# Patient Record
Sex: FEMALE | Race: WHITE | NOT HISPANIC OR LATINO | Employment: FULL TIME | URBAN - NONMETROPOLITAN AREA
[De-identification: names, ages, dates, MRNs, and addresses within clinical notes are randomized per-mention and may not be internally consistent; named-entity substitution may affect disease eponyms.]

---

## 2022-08-03 ENCOUNTER — OFFICE VISIT (OUTPATIENT)
Dept: URGENT CARE | Facility: CLINIC | Age: 37
End: 2022-08-03
Payer: COMMERCIAL

## 2022-08-03 VITALS
HEIGHT: 61 IN | HEART RATE: 88 BPM | OXYGEN SATURATION: 100 % | RESPIRATION RATE: 20 BRPM | SYSTOLIC BLOOD PRESSURE: 125 MMHG | WEIGHT: 151.4 LBS | TEMPERATURE: 99 F | DIASTOLIC BLOOD PRESSURE: 80 MMHG | BODY MASS INDEX: 28.58 KG/M2

## 2022-08-03 DIAGNOSIS — R11.0 NAUSEA: ICD-10-CM

## 2022-08-03 DIAGNOSIS — R10.31 RIGHT LOWER QUADRANT ABDOMINAL PAIN: Primary | ICD-10-CM

## 2022-08-03 PROBLEM — K21.9 GASTROESOPHAGEAL REFLUX DISEASE WITHOUT ESOPHAGITIS: Status: ACTIVE | Noted: 2022-08-03

## 2022-08-03 LAB
SL AMB  POCT GLUCOSE, UA: NORMAL
SL AMB LEUKOCYTE ESTERASE,UA: NORMAL
SL AMB POCT BILIRUBIN,UA: NORMAL
SL AMB POCT BLOOD,UA: NORMAL
SL AMB POCT CLARITY,UA: CLEAR
SL AMB POCT COLOR,UA: NORMAL
SL AMB POCT KETONES,UA: NORMAL
SL AMB POCT NITRITE,UA: NORMAL
SL AMB POCT PH,UA: 7.5
SL AMB POCT SPECIFIC GRAVITY,UA: 1.02
SL AMB POCT URINE PROTEIN: NORMAL
SL AMB POCT UROBILINOGEN: 0.2

## 2022-08-03 PROCEDURE — 81002 URINALYSIS NONAUTO W/O SCOPE: CPT | Performed by: PHYSICIAN ASSISTANT

## 2022-08-03 PROCEDURE — 99203 OFFICE O/P NEW LOW 30 MIN: CPT | Performed by: PHYSICIAN ASSISTANT

## 2022-08-03 PROCEDURE — S9088 SERVICES PROVIDED IN URGENT: HCPCS | Performed by: PHYSICIAN ASSISTANT

## 2022-08-03 RX ORDER — ALBUTEROL SULFATE 90 UG/1
AEROSOL, METERED RESPIRATORY (INHALATION)
COMMUNITY

## 2022-08-03 RX ORDER — ONDANSETRON 8 MG/1
8 TABLET, ORALLY DISINTEGRATING ORAL EVERY 8 HOURS PRN
Qty: 21 TABLET | Refills: 0 | Status: SHIPPED | OUTPATIENT
Start: 2022-08-03 | End: 2022-08-10

## 2022-08-03 RX ORDER — OMEPRAZOLE 40 MG/1
CAPSULE, DELAYED RELEASE ORAL
COMMUNITY

## 2022-08-03 NOTE — PATIENT INSTRUCTIONS
Acute Diarrhea   WHAT YOU NEED TO KNOW:   What do I need to know about acute diarrhea? Acute diarrhea starts quickly and lasts a short time, usually 1 to 3 days  It can last up to 2 weeks  What causes acute diarrhea? Bacteria, such as E coli or salmonella    Viruses, such as rotavirus and norovirus    A parasite, such as giardia    Medicines, such as laxatives, antacids, or antibiotics    An allergy to lactose, soy, or gluten    Eating food or drinking water that contains germs    Medical treatments, such as chemotherapy or radiation    What other signs and symptoms might I have with acute diarrhea? You may have 3 or more episodes of diarrhea  It may be hard to control your diarrhea  You may also have any of the following:  Fever and chills    Headache or abdominal pain    Nausea and vomiting    Symptoms of dehydration such as thirst, decreased urination, dry skin, sunken eyes, or fast, pounding heartbeat    What does my healthcare provider need to know about my acute diarrhea? Your healthcare provider will ask about your symptoms  He or she will ask what you have recently eaten and if you have traveled to other countries  Tell the provider what medicines you use or if you have been around anyone who is sick  Your healthcare provider may check you for signs of dehydration  He or she may also test your blood and bowel movement for infection  How is acute diarrhea treated? Acute diarrhea usually gets better without treatment  You may need any of the following if your diarrhea is severe or lasts longer than a few days:  Diarrhea medicine  is an over-the-counter medicine that helps slow or stop your diarrhea  Do not  take this medicine unless your healthcare provider says it is okay  Antibiotics  may be given to help treat an infection caused by bacteria  Antiparasitics  may be given to treat an infection caused by parasites  How can acute diarrhea be managed? Drink liquids as directed    Liquids will help prevent dehydration caused by diarrhea  Ask your healthcare provider how much liquid to drink each day and which liquids are best for you  You may need to drink an oral rehydration solution (ORS)  An ORS has the right amounts of water, salts, and sugar you need to replace body fluids  You can buy an ORS at most grocery stores and pharmacies  Eat foods that are easy to digest   Examples include rice, lentils, cereal, bananas, potatoes, and bread  It also includes some fruits (bananas, melon), well-cooked vegetables, and lean meats  Do not eat foods high in fiber, fat, or sugar  Also, do not drink alcohol until your diarrhea is gone  How can acute diarrhea be prevented? Wash your hands often  Use soap and water  Wash your hands before you eat or prepare food  Also wash your hands after you use the bathroom  Use an alcohol-based hand gel when soap and water are not available  Keep bathroom surfaces clean  This helps prevent the spread of germs that cause acute diarrhea  Wash fruits and vegetables well before you eat them  This can help remove germs that cause diarrhea  If possible, remove the skin from fruits and vegetables, or cook them well before you eat them  Cook meat and poultry as directed  Meat includes beef and pork  Poultry includes chicken, turkey, and duck  Cook ground meat  to 160°F      [employer], whole poultry, or cuts of poultry  to at least 165°F  Remove the poultry from heat  Let it stand for 3 minutes before you eat it  Cook whole cuts of meat other than poultry  to at least 145°F  Remove the meat from heat  Let it stand for 3 minutes before you eat it  Wash dishes that have touched raw meat or poultry with hot water and soap  This includes cutting boards, utensils, dishes, and serving containers  Place raw or cooked meat in the refrigerator as soon as possible  Bacteria can grow in meat that is left at room temperature too long       Do not eat raw or undercooked oysters, clams, or mussels  These foods may be contaminated and cause infection  Drink only filtered or treated water when you travel  Do not put ice in your drinks  Drink bottled water whenever possible  When should I seek immediate care? You feel confused  Your heartbeat is faster than usual      Your eyes look deeply sunken, or you have no tears when you cry  You urinate less than usual, or your urine is dark yellow  You have blood or mucus in your bowel movements  You have severe abdominal pain  You are unable to drink any liquids  When should I contact my healthcare provider? Your symptoms do not get better with treatment  You have a fever higher than 101 3°F (38 5°C)  You have trouble eating and drinking because you are vomiting  Your diarrhea does not get better in 7 days  You have questions or concerns about your condition or care  CARE AGREEMENT:   You have the right to help plan your care  Learn about your health condition and how it may be treated  Discuss treatment options with your healthcare providers to decide what care you want to receive  You always have the right to refuse treatment  The above information is an  only  It is not intended as medical advice for individual conditions or treatments  Talk to your doctor, nurse or pharmacist before following any medical regimen to see if it is safe and effective for you  © Copyright [employer] 2022 Information is for End User's use only and may not be sold, redistributed or otherwise used for commercial purposes   All illustrations and images included in CareNotes® are the copyrighted property of [email or web address]  or [address] [place] [address]

## 2022-08-03 NOTE — LETTER
August 3, 2022     Patient: Clare Dorsey   YOB: 1985   Date of Visit: 8/3/2022       To Whom it May Concern:    Clare Dorsey was seen in my clinic on 8/3/2022  She may return to work on 08/06/2022  If you have any questions or concerns, please don't hesitate to call           Sincerely,          Loyda Bhatt PA-C        CC: No Recipients

## 2022-08-03 NOTE — PROGRESS NOTES
[facility]        NAME: Josiane Barron is a 29 y o  female  : 1985    MRN: [id number]  DATE: August 3, 2022  TIME: 4:14 PM    Assessment and Plan   Right lower quadrant abdominal pain [R10 31]  1  Right lower quadrant abdominal pain  POCT urine dip   2  Nausea  ondansetron (Zofran ODT) 8 mg disintegrating tablet         Patient Instructions   Patient Instructions     Acute Diarrhea   WHAT YOU NEED TO KNOW:   What do I need to know about acute diarrhea? Acute diarrhea starts quickly and lasts a short time, usually 1 to 3 days  It can last up to 2 weeks  What causes acute diarrhea? · Bacteria, such as E coli or salmonella    · Viruses, such as rotavirus and norovirus    · A parasite, such as giardia    · Medicines, such as laxatives, antacids, or antibiotics    · An allergy to lactose, soy, or gluten    · Eating food or drinking water that contains germs    · Medical treatments, such as chemotherapy or radiation    What other signs and symptoms might I have with acute diarrhea? You may have 3 or more episodes of diarrhea  It may be hard to control your diarrhea  You may also have any of the following:  · Fever and chills    · Headache or abdominal pain    · Nausea and vomiting    · Symptoms of dehydration such as thirst, decreased urination, dry skin, sunken eyes, or fast, pounding heartbeat    What does my healthcare provider need to know about my acute diarrhea? Your healthcare provider will ask about your symptoms  He or she will ask what you have recently eaten and if you have traveled to other countries  Tell the provider what medicines you use or if you have been around anyone who is sick  Your healthcare provider may check you for signs of dehydration  He or she may also test your blood and bowel movement for infection  How is acute diarrhea treated? Acute diarrhea usually gets better without treatment   You may need any of the following if your diarrhea is severe or lasts longer than a few days:  · Diarrhea medicine  is an over-the-counter medicine that helps slow or stop your diarrhea  Do not  take this medicine unless your healthcare provider says it is okay  · Antibiotics  may be given to help treat an infection caused by bacteria  · Antiparasitics  may be given to treat an infection caused by parasites  How can acute diarrhea be managed? · Drink liquids as directed  Liquids will help prevent dehydration caused by diarrhea  Ask your healthcare provider how much liquid to drink each day and which liquids are best for you  You may need to drink an oral rehydration solution (ORS)  An ORS has the right amounts of water, salts, and sugar you need to replace body fluids  You can buy an ORS at most grocery stores and pharmacies  · Eat foods that are easy to digest   Examples include rice, lentils, cereal, bananas, potatoes, and bread  It also includes some fruits (bananas, melon), well-cooked vegetables, and lean meats  Do not eat foods high in fiber, fat, or sugar  Also, do not drink alcohol until your diarrhea is gone  How can acute diarrhea be prevented? 1  Wash your hands often  Use soap and water  Wash your hands before you eat or prepare food  Also wash your hands after you use the bathroom  Use an alcohol-based hand gel when soap and water are not available  2  Keep bathroom surfaces clean  This helps prevent the spread of germs that cause acute diarrhea  3  Wash fruits and vegetables well before you eat them  This can help remove germs that cause diarrhea  If possible, remove the skin from fruits and vegetables, or cook them well before you eat them  [address] as directed  Meat includes beef and pork  Poultry includes chicken, turkey, and duck  ? Cook ground meat  to 160°F      ? Cook ground poultry, whole poultry, or cuts of poultry  to at least 165°F  Remove the poultry from heat   Let it stand for 3 minutes before you eat it      ? Cook whole cuts of meat other than poultry  to at least 145°F  Remove the meat from heat  Let it stand for 3 minutes before you eat it  5  Wash dishes that have touched raw meat or poultry with hot water and soap  This includes cutting boards, utensils, dishes, and serving containers  6  Place raw or cooked meat in the refrigerator as soon as possible  Bacteria can grow in meat that is left at room temperature too long  7  Do not eat raw or undercooked oysters, clams, or mussels  These foods may be contaminated and cause infection  8  Drink only filtered or treated water when you travel  Do not put ice in your drinks  Drink bottled water whenever possible  When should I seek immediate care? · You feel confused  · Your heartbeat is faster than usual      · Your eyes look deeply sunken, or you have no tears when you cry  · You urinate less than usual, or your urine is dark yellow  · You have blood or mucus in your bowel movements  · You have severe abdominal pain  · You are unable to drink any liquids  When should I contact my healthcare provider? · Your symptoms do not get better with treatment  · You have a fever higher than 101 3°F (38 5°C)  · You have trouble eating and drinking because you are vomiting  · Your diarrhea does not get better in 7 days  · You have questions or concerns about your condition or care  CARE AGREEMENT:   You have the right to help plan your care  Learn about your health condition and how it may be treated  Discuss treatment options with your healthcare providers to decide what care you want to receive  You always have the right to refuse treatment  The above information is an  only  It is not intended as medical advice for individual conditions or treatments  Talk to your doctor, nurse or pharmacist before following any medical regimen to see if it is safe and effective for you    © Copyright [employer] 2022 Information is for [employer] use only and may not be sold, redistributed or otherwise used for commercial purposes  All illustrations and images included in CareNotes® are the copyrighted property of [employer]  or [employer]          Follow up with PCP in 3-5 days  Proceed to  ER if symptoms worsen  Chief Complaint     Chief Complaint   Patient presents with    Abdominal Pain     Right side sharp pain     Diarrhea     And nasuea going on for 2 weeks          History of Present Illness       The patient is a 28-year-old female who presents to the clinic complaining of right lower abdominal pain for the past 2 weeks  She states that the pain initially started in her right lower quadrant and now seems to be located more in her right pelvic area  She states the pain is intermittent in nature in seems to be worse with movement  She states the pain is sharp and approximately 5/10  She states the pain is better with rest   She also has associated nausea and has had diarrhea over the past 2 weeks  She denies associated fevers, chills, melena, or blood in her stools  She does have a history of gastric reflux but does not follow with GI and has never had he scope  She states that her menstrual cycle is normal and currently denies pregnancy  Review of Systems   Review of Systems   Constitutional: Negative for chills and fever  HENT: Negative for congestion, ear pain, postnasal drip, rhinorrhea, sinus pressure, sore throat and trouble swallowing  Eyes: Negative for pain and visual disturbance  Respiratory: Positive for cough  Negative for shortness of breath  Cardiovascular: Negative for chest pain and palpitations  Gastrointestinal: Positive for abdominal pain, diarrhea and nausea  Negative for constipation and vomiting  Genitourinary: Positive for difficulty urinating   Negative for dysuria, frequency, hematuria, menstrual problem, pelvic pain, urgency, vaginal bleeding, vaginal discharge and vaginal pain  Musculoskeletal: Negative for arthralgias and back pain  Skin: Negative for color change and rash  Neurological: Positive for light-headedness  Negative for seizures and syncope  All other systems reviewed and are negative  Current Medications       Current Outpatient Medications:     ondansetron (Zofran ODT) 8 mg disintegrating tablet, Take 1 tablet (8 mg total) by mouth every 8 (eight) hours as needed for nausea or vomiting for up to 7 days, Disp: 21 tablet, Rfl: 0    albuterol (PROVENTIL HFA,VENTOLIN HFA) 90 mcg/act inhaler, Take by mouth, Disp: , Rfl:     omeprazole (PriLOSEC) 40 MG capsule, , Disp: , Rfl:     Current Allergies     Allergies as of 08/03/2022    (No Known Allergies)            The following portions of the patient's history were reviewed and updated as appropriate: allergies, current medications, past family history, past medical history, past social history, past surgical history and problem list      Past Medical History:   Diagnosis Date    Asthma        History reviewed  No pertinent surgical history  Family History   Problem Relation Age of Onset    Diabetes Father          Medications have been verified  Objective   /80   Pulse 88   Temp 99 °F (37 2 °C)   Resp 20   Ht 5' 1 2" (1 554 m)   Wt 68 7 kg (151 lb 6 4 oz)   SpO2 100%   BMI 28 42 kg/m²        Physical Exam     Physical Exam  Constitutional:       Appearance: She is well-developed  She is not diaphoretic  HENT:      Head: Normocephalic  Eyes:      General:         Right eye: No discharge  Left eye: No discharge  Pupils: Pupils are equal, round, and reactive to light  Neck:      Thyroid: No thyromegaly  Cardiovascular:      Rate and Rhythm: Normal rate  Heart sounds: No murmur heard  Pulmonary:      Effort: Pulmonary effort is normal  No respiratory distress  Breath sounds: No wheezing or rales     Chest:      Chest wall: No tenderness  Abdominal:      General: There is no distension  Palpations: Abdomen is soft  Tenderness: There is abdominal tenderness in the right lower quadrant and suprapubic area  There is no guarding or rebound  Musculoskeletal:         General: Normal range of motion  Cervical back: Normal range of motion  Lymphadenopathy:      Cervical: No cervical adenopathy  Skin:     General: Skin is warm  Neurological:      Mental Status: She is alert and oriented to person, place, and time  Since the patient's symptoms have been going on for 2 weeks she will need further testing ankle doing stool cultures and labs  I suggest she contact her primary care doctor to order these test   I will give her Zofran for nausea  The patient was instructed to go the ER if symptoms worsen

## 2024-04-14 ENCOUNTER — HOSPITAL ENCOUNTER (EMERGENCY)
Facility: HOSPITAL | Age: 39
Discharge: HOME | End: 2024-04-15
Attending: EMERGENCY MEDICINE | Admitting: EMERGENCY MEDICINE
Payer: COMMERCIAL

## 2024-04-14 ENCOUNTER — APPOINTMENT (EMERGENCY)
Dept: CT IMAGING | Facility: HOSPITAL | Age: 39
End: 2024-04-14
Payer: COMMERCIAL

## 2024-04-14 VITALS
TEMPERATURE: 99 F | WEIGHT: 154.1 LBS | BODY MASS INDEX: 30.25 KG/M2 | SYSTOLIC BLOOD PRESSURE: 131 MMHG | HEIGHT: 60 IN | DIASTOLIC BLOOD PRESSURE: 76 MMHG | OXYGEN SATURATION: 99 % | HEART RATE: 100 BPM | RESPIRATION RATE: 17 BRPM

## 2024-04-14 DIAGNOSIS — N83.202 CYST OF LEFT OVARY: ICD-10-CM

## 2024-04-14 DIAGNOSIS — R10.31 RIGHT LOWER QUADRANT ABDOMINAL PAIN: Primary | ICD-10-CM

## 2024-04-14 LAB
ALBUMIN SERPL BCP-MCNC: 4.4 G/DL (ref 3.5–5)
ALP SERPL-CCNC: 51 U/L (ref 34–104)
ALT SERPL W P-5'-P-CCNC: 9 U/L (ref 7–52)
ANION GAP SERPL CALCULATED.3IONS-SCNC: 9 MMOL/L (ref 4–13)
AST SERPL W P-5'-P-CCNC: 12 U/L (ref 13–39)
BASOPHILS # BLD AUTO: 0.05 THOUSANDS/ÂΜL (ref 0–0.1)
BASOPHILS NFR BLD AUTO: 1 % (ref 0–1)
BILIRUB SERPL-MCNC: 0.27 MG/DL (ref 0.2–1)
BILIRUB UR QL STRIP: ABNORMAL
BUN SERPL-MCNC: 17 MG/DL (ref 5–25)
CALCIUM SERPL-MCNC: 9.3 MG/DL (ref 8.4–10.2)
CHLORIDE SERPL-SCNC: 106 MMOL/L (ref 96–108)
CLARITY UR: CLEAR
CO2 SERPL-SCNC: 22 MMOL/L (ref 21–32)
COLOR UR: YELLOW
CREAT SERPL-MCNC: 1.14 MG/DL (ref 0.6–1.3)
EOSINOPHIL # BLD AUTO: 0.48 THOUSAND/ÂΜL (ref 0–0.61)
EOSINOPHIL NFR BLD AUTO: 5 % (ref 0–6)
ERYTHROCYTE [DISTWIDTH] IN BLOOD BY AUTOMATED COUNT: 12.7 % (ref 11.6–15.1)
EXT PREGNANCY TEST URINE: NEGATIVE
EXT. CONTROL: NORMAL
GFR SERPL CREATININE-BSD FRML MDRD: 61 ML/MIN/1.73SQ M
GLUCOSE SERPL-MCNC: 106 MG/DL (ref 65–140)
GLUCOSE UR STRIP-MCNC: NEGATIVE MG/DL
HCT VFR BLD AUTO: 41.9 % (ref 34.8–46.1)
HGB BLD-MCNC: 13.6 G/DL (ref 11.5–15.4)
HGB UR QL STRIP.AUTO: NEGATIVE
IMM GRANULOCYTES # BLD AUTO: 0.05 THOUSAND/UL (ref 0–0.2)
IMM GRANULOCYTES NFR BLD AUTO: 1 % (ref 0–2)
KETONES UR STRIP-MCNC: ABNORMAL MG/DL
LEUKOCYTE ESTERASE UR QL STRIP: NEGATIVE
LIPASE SERPL-CCNC: 36 U/L (ref 11–82)
LYMPHOCYTES # BLD AUTO: 3.11 THOUSANDS/ÂΜL (ref 0.6–4.47)
LYMPHOCYTES NFR BLD AUTO: 32 % (ref 14–44)
MCH RBC QN AUTO: 28.5 PG (ref 26.8–34.3)
MCHC RBC AUTO-ENTMCNC: 32.5 G/DL (ref 31.4–37.4)
MCV RBC AUTO: 88 FL (ref 82–98)
MONOCYTES # BLD AUTO: 0.68 THOUSAND/ÂΜL (ref 0.17–1.22)
MONOCYTES NFR BLD AUTO: 7 % (ref 4–12)
NEUTROPHILS # BLD AUTO: 5.37 THOUSANDS/ÂΜL (ref 1.85–7.62)
NEUTS SEG NFR BLD AUTO: 54 % (ref 43–75)
NITRITE UR QL STRIP: NEGATIVE
NRBC BLD AUTO-RTO: 0 /100 WBCS
PH UR STRIP.AUTO: 6 [PH]
PLATELET # BLD AUTO: 322 THOUSANDS/UL (ref 149–390)
PMV BLD AUTO: 9.1 FL (ref 8.9–12.7)
POTASSIUM SERPL-SCNC: 3.7 MMOL/L (ref 3.5–5.3)
PROT SERPL-MCNC: 7.3 G/DL (ref 6.4–8.4)
PROT UR STRIP-MCNC: NEGATIVE MG/DL
RBC # BLD AUTO: 4.77 MILLION/UL (ref 3.81–5.12)
SODIUM SERPL-SCNC: 137 MMOL/L (ref 135–147)
SP GR UR STRIP.AUTO: >=1.03 (ref 1–1.03)
UROBILINOGEN UR QL STRIP.AUTO: 1 E.U./DL
WBC # BLD AUTO: 9.74 THOUSAND/UL (ref 4.31–10.16)

## 2024-04-14 PROCEDURE — 74177 CT ABD & PELVIS W/CONTRAST: CPT

## 2024-04-14 PROCEDURE — 96375 TX/PRO/DX INJ NEW DRUG ADDON: CPT

## 2024-04-14 PROCEDURE — 81025 URINE PREGNANCY TEST: CPT | Performed by: EMERGENCY MEDICINE

## 2024-04-14 PROCEDURE — 36415 COLL VENOUS BLD VENIPUNCTURE: CPT | Performed by: EMERGENCY MEDICINE

## 2024-04-14 PROCEDURE — 99285 EMERGENCY DEPT VISIT HI MDM: CPT | Performed by: EMERGENCY MEDICINE

## 2024-04-14 PROCEDURE — 83690 ASSAY OF LIPASE: CPT | Performed by: EMERGENCY MEDICINE

## 2024-04-14 PROCEDURE — 80053 COMPREHEN METABOLIC PANEL: CPT | Performed by: EMERGENCY MEDICINE

## 2024-04-14 PROCEDURE — 99284 EMERGENCY DEPT VISIT MOD MDM: CPT

## 2024-04-14 PROCEDURE — 85025 COMPLETE CBC W/AUTO DIFF WBC: CPT | Performed by: EMERGENCY MEDICINE

## 2024-04-14 PROCEDURE — 96374 THER/PROPH/DIAG INJ IV PUSH: CPT

## 2024-04-14 PROCEDURE — 81003 URINALYSIS AUTO W/O SCOPE: CPT | Performed by: EMERGENCY MEDICINE

## 2024-04-14 PROCEDURE — 96361 HYDRATE IV INFUSION ADD-ON: CPT

## 2024-04-14 RX ORDER — KETOROLAC TROMETHAMINE 30 MG/ML
15 INJECTION, SOLUTION INTRAMUSCULAR; INTRAVENOUS ONCE
Status: COMPLETED | OUTPATIENT
Start: 2024-04-14 | End: 2024-04-14

## 2024-04-14 RX ORDER — ONDANSETRON 2 MG/ML
4 INJECTION INTRAMUSCULAR; INTRAVENOUS ONCE
Status: COMPLETED | OUTPATIENT
Start: 2024-04-14 | End: 2024-04-14

## 2024-04-14 RX ADMIN — IOHEXOL 100 ML: 350 INJECTION, SOLUTION INTRAVENOUS at 23:10

## 2024-04-14 RX ADMIN — ONDANSETRON 4 MG: 2 INJECTION INTRAMUSCULAR; INTRAVENOUS at 23:08

## 2024-04-14 RX ADMIN — SODIUM CHLORIDE 1000 ML: 0.9 INJECTION, SOLUTION INTRAVENOUS at 23:09

## 2024-04-14 RX ADMIN — KETOROLAC TROMETHAMINE 15 MG: 30 INJECTION, SOLUTION INTRAMUSCULAR; INTRAVENOUS at 23:09

## 2024-04-14 NOTE — Clinical Note
Stephanie Hosler was seen and treated in our emergency department on 4/14/2024.                Diagnosis:     Maribell  may return to work on return date.    She may return on this date: 04/16/2024         If you have any questions or concerns, please don't hesitate to call.      Anne-Marie Suarez, DO    ______________________________           _______________          _______________  Hospital Representative                              Date                                Time

## 2024-04-15 RX ORDER — ONDANSETRON 4 MG/1
4 TABLET, ORALLY DISINTEGRATING ORAL EVERY 6 HOURS PRN
Qty: 20 TABLET | Refills: 0 | Status: SHIPPED | OUTPATIENT
Start: 2024-04-15

## 2024-04-15 RX ORDER — DICYCLOMINE HCL 20 MG
20 TABLET ORAL ONCE
Status: COMPLETED | OUTPATIENT
Start: 2024-04-15 | End: 2024-04-15

## 2024-04-15 RX ORDER — DICYCLOMINE HCL 20 MG
20 TABLET ORAL 2 TIMES DAILY
Qty: 20 TABLET | Refills: 0 | Status: SHIPPED | OUTPATIENT
Start: 2024-04-15

## 2024-04-15 RX ADMIN — DICYCLOMINE HYDROCHLORIDE 20 MG: 20 TABLET ORAL at 00:32

## 2024-04-15 NOTE — ED PROVIDER NOTES
History  Chief Complaint   Patient presents with    Abdominal Pain     Stabbing lower umbilical pain since 0300 when it woke her up. Pt took no medicines today. +nausea Denies V/D or constipation     Patient is a 38-year-old female presenting to the emergency department today complaining of periumbilical pain now in the right lower quadrant associated with nausea, decreased appetite, symptoms started at 3 AM and worsen throughout the day, she denies any vomiting, no diarrhea, no dysuria or hematuria, no fever or chills, no sick contacts or questionable food intake, no previous abdominal surgeries, denies pregnancy        Prior to Admission Medications   Prescriptions Last Dose Informant Patient Reported? Taking?   albuterol (PROVENTIL HFA,VENTOLIN HFA) 90 mcg/act inhaler   Yes No   Sig: Take by mouth   omeprazole (PriLOSEC) 40 MG capsule   Yes No   ondansetron (Zofran ODT) 8 mg disintegrating tablet   No No   Sig: Take 1 tablet (8 mg total) by mouth every 8 (eight) hours as needed for nausea or vomiting for up to 7 days      Facility-Administered Medications: None       Past Medical History:   Diagnosis Date    Asthma        History reviewed. No pertinent surgical history.    Family History   Problem Relation Age of Onset    Diabetes Father      I have reviewed and agree with the history as documented.    E-Cigarette/Vaping    E-Cigarette Use Never User      E-Cigarette/Vaping Substances     Social History     Tobacco Use    Smoking status: Never    Smokeless tobacco: Never   Vaping Use    Vaping status: Never Used   Substance Use Topics    Alcohol use: Never    Drug use: Never       Review of Systems   Constitutional: Negative.    HENT: Negative.     Eyes: Negative.    Respiratory: Negative.     Cardiovascular: Negative.    Gastrointestinal:  Positive for abdominal pain and nausea.   Endocrine: Negative.    Genitourinary: Negative.    Musculoskeletal: Negative.    Skin: Negative.    Allergic/Immunologic: Negative.     Neurological: Negative.    Hematological: Negative.    Psychiatric/Behavioral: Negative.         Physical Exam  Physical Exam  Constitutional:       Appearance: She is well-developed.   HENT:      Head: Normocephalic and atraumatic.   Eyes:      Conjunctiva/sclera: Conjunctivae normal.      Pupils: Pupils are equal, round, and reactive to light.   Cardiovascular:      Rate and Rhythm: Normal rate.   Pulmonary:      Effort: Pulmonary effort is normal.   Abdominal:      Palpations: Abdomen is soft.      Tenderness: There is abdominal tenderness in the right lower quadrant and periumbilical area.   Musculoskeletal:         General: Normal range of motion.      Cervical back: Normal range of motion and neck supple.   Skin:     General: Skin is warm and dry.   Neurological:      Mental Status: She is alert and oriented to person, place, and time.         Vital Signs  ED Triage Vitals   Temperature Pulse Respirations Blood Pressure SpO2   04/14/24 2213 04/14/24 2213 04/14/24 2213 04/14/24 2213 04/14/24 2213   99 °F (37.2 °C) 100 17 131/76 99 %      Temp Source Heart Rate Source Patient Position - Orthostatic VS BP Location FiO2 (%)   04/14/24 2213 04/14/24 2213 04/14/24 2213 04/14/24 2213 --   Temporal Monitor Sitting Right arm       Pain Score       04/14/24 2309       7           Vitals:    04/14/24 2213   BP: 131/76   Pulse: 100   Patient Position - Orthostatic VS: Sitting         Visual Acuity      ED Medications  Medications   ondansetron (ZOFRAN) injection 4 mg (4 mg Intravenous Given 4/14/24 2308)   sodium chloride 0.9 % bolus 1,000 mL (0 mL Intravenous Stopped 4/15/24 0035)   ketorolac (TORADOL) injection 15 mg (15 mg Intravenous Given 4/14/24 2309)   iohexol (OMNIPAQUE) 350 MG/ML injection (SINGLE-DOSE) 100 mL (100 mL Intravenous Given 4/14/24 2310)   dicyclomine (BENTYL) tablet 20 mg (20 mg Oral Given 4/15/24 0032)       Diagnostic Studies  Results Reviewed       Procedure Component Value Units Date/Time     Comprehensive metabolic panel [[id number]]  (Abnormal) Collected: 04/14/24 2215    Lab Status: Final result Specimen: Blood from Arm, Right Updated: 04/14/24 2247     Sodium 137 mmol/L      Potassium 3.7 mmol/L      Chloride 106 mmol/L      CO2 22 mmol/L      ANION GAP 9 mmol/L      BUN 17 mg/dL      Creatinine 1.14 mg/dL      Glucose 106 mg/dL      Calcium 9.3 mg/dL      AST 12 U/L      ALT 9 U/L      Alkaline Phosphatase 51 U/L      Total Protein 7.3 g/dL      Albumin 4.4 g/dL      Total Bilirubin 0.27 mg/dL      eGFR 61 ml/min/1.73sq m     Narrative:      [employer] guidelines for Chronic Kidney Disease (CKD):     Stage 1 with normal or high GFR (GFR > 90 mL/min/1.73 square meters)    Stage 2 Mild CKD (GFR = 60-89 mL/min/1.73 square meters)    Stage 3A Moderate CKD (GFR = 45-59 mL/min/1.73 square meters)    Stage 3B Moderate CKD (GFR = 30-44 mL/min/1.73 square meters)    Stage 4 Severe CKD (GFR = 15-29 mL/min/1.73 square meters)    Stage 5 End Stage CKD (GFR <15 mL/min/1.73 square meters)  Note: GFR calculation is accurate only with a steady state creatinine    Lipase [[id number]]  (Normal) Collected: 04/14/24 2215    Lab Status: Final result Specimen: Blood from Arm, Right Updated: 04/14/24 2247     Lipase 36 u/L     CBC and differential [[id number]] Collected: 04/14/24 2215    Lab Status: Final result Specimen: Blood from Arm, Right Updated: 04/14/24 2244     WBC 9.74 Thousand/uL      RBC 4.77 Million/uL      Hemoglobin 13.6 g/dL      Hematocrit 41.9 %      MCV 88 fL      MCH 28.5 pg      MCHC 32.5 g/dL      RDW 12.7 %      MPV 9.1 fL      Platelets 322 Thousands/uL      nRBC 0 /100 WBCs      Segmented % 54 %      Immature Grans % 1 %      Lymphocytes % 32 %      Monocytes % 7 %      Eosinophils Relative 5 %      Basophils Relative 1 %      Absolute Neutrophils 5.37 Thousands/µL      Absolute Immature Grans 0.05 Thousand/uL      Absolute Lymphocytes 3.11 Thousands/µL      Absolute Monocytes  0.68 Thousand/µL      Eosinophils Absolute 0.48 Thousand/µL      Basophils Absolute 0.05 Thousands/µL     UA w Reflex to Microscopic w Reflex to Culture [[id number]]  (Abnormal) Collected: 04/14/24 2225    Lab Status: Final result Specimen: Urine, Clean Catch Updated: 04/14/24 2229     Color, UA Yellow     Clarity, UA Clear     Specific Gravity, UA >=1.030     pH, UA 6.0     Leukocytes, UA Negative     Nitrite, UA Negative     Protein, UA Negative mg/dl      Glucose, UA Negative mg/dl      Ketones, UA Trace mg/dl      Urobilinogen, UA 1.0 E.U./dl      Bilirubin, UA Small     Occult Blood, UA Negative    POCT pregnancy, urine [[id number]]  (Normal) Resulted: 04/14/24 2224    Lab Status: Final result Updated: 04/14/24 2224     EXT Preg Test, Ur Negative     Control Valid                   CT abdomen pelvis with contrast   Final Result by Kb Duran DO (04/15 0006)      No acute findings in the abdomen or pelvis.      4.5 cm left adnexal cystic structure. Unless there is concern for ovarian torsion, no further imaging follow-up is recommended.      Workstation performed: [id number]                    Procedures  Procedures         ED Course  ED Course as of 04/16/24 0202   Tue Apr 16, 2024   0202 UA w Reflex to Microscopic w Reflex to Culture(!)   0202 POCT pregnancy, urine   0202 Lipase   0202 Comprehensive metabolic panel(!)   0202 CBC and differential   0202 CT abdomen pelvis with contrast                                             Medical Decision Making  Abdominal exam without peritoneal signs.  No evidence of acute abdomen at this time.  Well-appearing.  Given work-up, low suspicion for acute hepatobiliary disease (including acute cholecystitis or cholangitis), acute pancreatitis (negative lipase), PUD (including gastric perforation), acute infectious processes (pneumonia, hepatitis, pyelonephritis), acute appendicitis, vascular catastrophe, bowel obstruction, viscus perforation, ovarian/testicular torsion, or  diverticulitis.  Presentation not consistent with other acute emergent causes of abdominal pain at this time.    Problems Addressed:  Cyst of left ovary: acute illness or injury  Right lower quadrant abdominal pain: acute illness or injury    Amount and/or Complexity of Data Reviewed  Labs: ordered. Decision-making details documented in ED Course.  Radiology: ordered.    Risk  Prescription drug management.             Disposition  Final diagnoses:   Right lower quadrant abdominal pain   Cyst of left ovary     Time reflects when diagnosis was documented in both MDM as applicable and the Disposition within this note       Time User Action Codes Description Comment    4/15/2024 12:13 AM Anne-Marie Suarez Add [R10.31] Right lower quadrant abdominal pain     4/15/2024 12:13 AM Anne-Marie Suarez [N83.202] Cyst of left ovary           ED Disposition       ED Disposition   Discharge    Condition   Stable    Date/Time   Mon Apr 15, 2024 12:13 AM    Comment   Stephanie Hosler discharge to home/self care.                   Follow-up Information       Follow up With Specialties Details Why Contact Info    Gene Kim, DO Internal Medicine In 2 days  [address] [facility] [address]  [phone]              Discharge Medication List as of 4/15/2024 12:14 AM        START taking these medications    Details   dicyclomine (BENTYL) 20 mg tablet Take 1 tablet (20 mg total) by mouth 2 (two) times a day, Starting Mon 4/15/2024, Normal           CONTINUE these medications which have CHANGED    Details   ondansetron (Zofran ODT) 4 mg disintegrating tablet Take 1 tablet (4 mg total) by mouth every 6 (six) hours as needed for nausea or vomiting, Starting Mon 4/15/2024, Normal           CONTINUE these medications which have NOT CHANGED    Details   albuterol (PROVENTIL HFA,VENTOLIN HFA) 90 mcg/act inhaler Take by mouth, Historical Med      omeprazole (PriLOSEC) 40 MG capsule Historical Med             No discharge procedures on  file.    PDMP Review       None            ED Provider  Electronically Signed by             Anne-Marie Suarez DO  04/16/24 0201